# Patient Record
Sex: FEMALE | Race: BLACK OR AFRICAN AMERICAN | NOT HISPANIC OR LATINO | Employment: FULL TIME | URBAN - METROPOLITAN AREA
[De-identification: names, ages, dates, MRNs, and addresses within clinical notes are randomized per-mention and may not be internally consistent; named-entity substitution may affect disease eponyms.]

---

## 2017-08-24 ENCOUNTER — HOSPITAL ENCOUNTER (EMERGENCY)
Facility: HOSPITAL | Age: 62
Discharge: HOME | End: 2017-08-24
Attending: EMERGENCY MEDICINE | Admitting: EMERGENCY MEDICINE

## 2017-08-24 ENCOUNTER — APPOINTMENT (OUTPATIENT)
Dept: MRI IMAGING | Facility: HOSPITAL | Age: 62
End: 2017-08-24

## 2017-08-24 ENCOUNTER — APPOINTMENT (OUTPATIENT)
Dept: GENERAL RADIOLOGY | Facility: HOSPITAL | Age: 62
End: 2017-08-24

## 2017-08-24 ENCOUNTER — APPOINTMENT (OUTPATIENT)
Dept: CT IMAGING | Facility: HOSPITAL | Age: 62
End: 2017-08-24

## 2017-08-24 VITALS
WEIGHT: 195 LBS | DIASTOLIC BLOOD PRESSURE: 88 MMHG | HEIGHT: 69 IN | HEART RATE: 79 BPM | BODY MASS INDEX: 28.88 KG/M2 | RESPIRATION RATE: 20 BRPM | TEMPERATURE: 98.6 F | SYSTOLIC BLOOD PRESSURE: 162 MMHG | OXYGEN SATURATION: 100 %

## 2017-08-24 DIAGNOSIS — R42 VERTIGO: ICD-10-CM

## 2017-08-24 DIAGNOSIS — R42 DIZZINESS: Primary | ICD-10-CM

## 2017-08-24 DIAGNOSIS — I10 ESSENTIAL HYPERTENSION: ICD-10-CM

## 2017-08-24 LAB
ALBUMIN SERPL-MCNC: 4.8 G/DL (ref 3.2–4.8)
ALBUMIN/GLOB SERPL: 1.4 G/DL (ref 1.5–2.5)
ALP SERPL-CCNC: 105 U/L (ref 25–100)
ALT SERPL W P-5'-P-CCNC: 29 U/L (ref 7–40)
ANION GAP SERPL CALCULATED.3IONS-SCNC: 12 MMOL/L (ref 3–11)
AST SERPL-CCNC: 26 U/L (ref 0–33)
BACTERIA UR QL AUTO: ABNORMAL /HPF
BASOPHILS # BLD AUTO: 0.01 10*3/MM3 (ref 0–0.2)
BASOPHILS NFR BLD AUTO: 0.1 % (ref 0–1)
BILIRUB SERPL-MCNC: 1 MG/DL (ref 0.3–1.2)
BILIRUB UR QL STRIP: ABNORMAL
BUN BLD-MCNC: 13 MG/DL (ref 9–23)
BUN/CREAT SERPL: 18.6 (ref 7–25)
CALCIUM SPEC-SCNC: 9.9 MG/DL (ref 8.7–10.4)
CHLORIDE SERPL-SCNC: 102 MMOL/L (ref 99–109)
CLARITY UR: CLEAR
CO2 SERPL-SCNC: 24 MMOL/L (ref 20–31)
COLOR UR: YELLOW
CREAT BLD-MCNC: 0.7 MG/DL (ref 0.6–1.3)
DEPRECATED RDW RBC AUTO: 41.3 FL (ref 37–54)
EOSINOPHIL # BLD AUTO: 0.05 10*3/MM3 (ref 0–0.3)
EOSINOPHIL NFR BLD AUTO: 0.7 % (ref 0–3)
ERYTHROCYTE [DISTWIDTH] IN BLOOD BY AUTOMATED COUNT: 12.6 % (ref 11.3–14.5)
GFR SERPL CREATININE-BSD FRML MDRD: 102 ML/MIN/1.73
GLOBULIN UR ELPH-MCNC: 3.5 GM/DL
GLUCOSE BLD-MCNC: 105 MG/DL (ref 70–100)
GLUCOSE UR STRIP-MCNC: NEGATIVE MG/DL
HCT VFR BLD AUTO: 46.9 % (ref 34.5–44)
HGB BLD-MCNC: 15.5 G/DL (ref 11.5–15.5)
HGB UR QL STRIP.AUTO: NEGATIVE
HOLD SPECIMEN: NORMAL
HOLD SPECIMEN: NORMAL
HYALINE CASTS UR QL AUTO: ABNORMAL /LPF
IMM GRANULOCYTES # BLD: 0.02 10*3/MM3 (ref 0–0.03)
IMM GRANULOCYTES NFR BLD: 0.3 % (ref 0–0.6)
KETONES UR QL STRIP: ABNORMAL
LEUKOCYTE ESTERASE UR QL STRIP.AUTO: NEGATIVE
LYMPHOCYTES # BLD AUTO: 2.16 10*3/MM3 (ref 0.6–4.8)
LYMPHOCYTES NFR BLD AUTO: 30.5 % (ref 24–44)
MAGNESIUM SERPL-MCNC: 2.4 MG/DL (ref 1.3–2.7)
MCH RBC QN AUTO: 29.6 PG (ref 27–31)
MCHC RBC AUTO-ENTMCNC: 33 G/DL (ref 32–36)
MCV RBC AUTO: 89.7 FL (ref 80–99)
MONOCYTES # BLD AUTO: 0.56 10*3/MM3 (ref 0–1)
MONOCYTES NFR BLD AUTO: 7.9 % (ref 0–12)
NEUTROPHILS # BLD AUTO: 4.28 10*3/MM3 (ref 1.5–8.3)
NEUTROPHILS NFR BLD AUTO: 60.5 % (ref 41–71)
NITRITE UR QL STRIP: NEGATIVE
PH UR STRIP.AUTO: 5.5 [PH] (ref 5–8)
PLATELET # BLD AUTO: 257 10*3/MM3 (ref 150–450)
PMV BLD AUTO: 10.2 FL (ref 6–12)
POTASSIUM BLD-SCNC: 3.4 MMOL/L (ref 3.5–5.5)
PROT SERPL-MCNC: 8.3 G/DL (ref 5.7–8.2)
PROT UR QL STRIP: ABNORMAL
RBC # BLD AUTO: 5.23 10*6/MM3 (ref 3.89–5.14)
RBC # UR: ABNORMAL /HPF
REF LAB TEST METHOD: ABNORMAL
SODIUM BLD-SCNC: 138 MMOL/L (ref 132–146)
SP GR UR STRIP: 1.02 (ref 1–1.03)
SQUAMOUS #/AREA URNS HPF: ABNORMAL /HPF
TROPONIN I SERPL-MCNC: 0 NG/ML (ref 0–0.07)
TROPONIN I SERPL-MCNC: 0 NG/ML (ref 0–0.07)
UROBILINOGEN UR QL STRIP: ABNORMAL
WBC NRBC COR # BLD: 7.08 10*3/MM3 (ref 3.5–10.8)
WBC UR QL AUTO: ABNORMAL /HPF
WHOLE BLOOD HOLD SPECIMEN: NORMAL
WHOLE BLOOD HOLD SPECIMEN: NORMAL

## 2017-08-24 PROCEDURE — 71010 HC CHEST PA OR AP: CPT

## 2017-08-24 PROCEDURE — 85025 COMPLETE CBC W/AUTO DIFF WBC: CPT | Performed by: EMERGENCY MEDICINE

## 2017-08-24 PROCEDURE — 80053 COMPREHEN METABOLIC PANEL: CPT | Performed by: EMERGENCY MEDICINE

## 2017-08-24 PROCEDURE — 93005 ELECTROCARDIOGRAM TRACING: CPT

## 2017-08-24 PROCEDURE — 84484 ASSAY OF TROPONIN QUANT: CPT

## 2017-08-24 PROCEDURE — 96360 HYDRATION IV INFUSION INIT: CPT

## 2017-08-24 PROCEDURE — 99284 EMERGENCY DEPT VISIT MOD MDM: CPT

## 2017-08-24 PROCEDURE — 70551 MRI BRAIN STEM W/O DYE: CPT

## 2017-08-24 PROCEDURE — 81001 URINALYSIS AUTO W/SCOPE: CPT | Performed by: EMERGENCY MEDICINE

## 2017-08-24 PROCEDURE — 83735 ASSAY OF MAGNESIUM: CPT | Performed by: EMERGENCY MEDICINE

## 2017-08-24 PROCEDURE — 70450 CT HEAD/BRAIN W/O DYE: CPT

## 2017-08-24 RX ORDER — SODIUM CHLORIDE 0.9 % (FLUSH) 0.9 %
10 SYRINGE (ML) INJECTION AS NEEDED
Status: DISCONTINUED | OUTPATIENT
Start: 2017-08-24 | End: 2017-08-25 | Stop reason: HOSPADM

## 2017-08-24 RX ORDER — LABETALOL HYDROCHLORIDE 5 MG/ML
20 INJECTION, SOLUTION INTRAVENOUS ONCE
Status: DISCONTINUED | OUTPATIENT
Start: 2017-08-24 | End: 2017-08-25 | Stop reason: HOSPADM

## 2017-08-24 RX ORDER — MECLIZINE HYDROCHLORIDE 25 MG/1
25 TABLET ORAL ONCE
Status: COMPLETED | OUTPATIENT
Start: 2017-08-24 | End: 2017-08-24

## 2017-08-24 RX ORDER — LISINOPRIL AND HYDROCHLOROTHIAZIDE 12.5; 1 MG/1; MG/1
1 TABLET ORAL DAILY
Qty: 30 TABLET | Refills: 0 | Status: SHIPPED | OUTPATIENT
Start: 2017-08-24

## 2017-08-24 RX ORDER — MECLIZINE HYDROCHLORIDE 25 MG/1
25 TABLET ORAL EVERY 6 HOURS PRN
Qty: 24 TABLET | Refills: 0 | Status: SHIPPED | OUTPATIENT
Start: 2017-08-24

## 2017-08-24 RX ADMIN — SODIUM CHLORIDE 1000 ML: 9 INJECTION, SOLUTION INTRAVENOUS at 19:58

## 2017-08-24 RX ADMIN — MECLIZINE 25 MG: 25 TABLET ORAL at 19:55

## 2017-08-24 NOTE — ED PROVIDER NOTES
Subjective   HPI Comments: Nataliia Werner is a 62 y.o. female who presents to the ED with c/o dizziness with onset yesterday at 0545. Patient states that as she woke up she suddenly felt as if the room was spinning. Her dizziness can be resolved when she stares in one direction for an extended period of time but it is exacerbated when she looks in different directions. She also complains of photophobia, generalized weakness, ear pressure, lightheadedness, and that her face is warm to touch.  Her dizziness was resolved yesterday at 1400 and her sx have been mostly resolved at this point. She denies any N/V, blurred vision, headache, chest pain, SoA, rhinorrhea, cough, congestion chills, urinary sx, or any other complaints at this time.      Patient is a 62 y.o. female presenting with dizziness.   History provided by:  Patient  Dizziness   Quality:  Room spinning and lightheadedness  Severity:  Mild  Onset quality:  Sudden  Duration: onset yesterday at 0545.  Timing:  Constant  Progression:  Partially resolved  Chronicity:  New  Relieved by:  Being still  Worsened by:  Turning head  Ineffective treatments:  None tried  Associated symptoms: weakness    Associated symptoms: no chest pain, no headaches, no nausea, no shortness of breath, no vision changes and no vomiting        Review of Systems   Constitutional: Negative for chills and fever.   HENT: Negative for congestion and rhinorrhea.    Eyes: Positive for photophobia. Negative for visual disturbance (No blurred vision).   Respiratory: Negative for cough and shortness of breath.    Cardiovascular: Negative for chest pain.   Gastrointestinal: Negative for nausea and vomiting.   Genitourinary: Negative for difficulty urinating, dysuria and frequency.   Neurological: Positive for dizziness, weakness and light-headedness. Negative for headaches.   All other systems reviewed and are negative.      History reviewed. No pertinent past medical history.    No Known  Allergies    Past Surgical History:   Procedure Laterality Date   • HYSTERECTOMY     • TONSILLECTOMY         History reviewed. No pertinent family history.    Social History     Social History   • Marital status:      Spouse name: N/A   • Number of children: N/A   • Years of education: N/A     Social History Main Topics   • Smoking status: Never Smoker   • Smokeless tobacco: None   • Alcohol use No   • Drug use: No   • Sexual activity: Defer     Other Topics Concern   • None     Social History Narrative   • None         Objective   Physical Exam   Constitutional: She is oriented to person, place, and time. She appears well-developed and well-nourished. No distress.   HENT:   Head: Normocephalic and atraumatic.   Eyes: EOM are normal. Pupils are equal, round, and reactive to light.   Neck: Normal range of motion. Neck supple.   Cardiovascular: Normal rate, regular rhythm and normal heart sounds.    No murmur heard.  Pulmonary/Chest: Effort normal and breath sounds normal. No respiratory distress.   Abdominal: Soft. There is no tenderness.   Musculoskeletal: Normal range of motion. She exhibits no edema or deformity.   Neurological: She is alert and oriented to person, place, and time. No cranial nerve deficit.   Skin: Skin is warm and dry.   Psychiatric: She has a normal mood and affect. Her behavior is normal.   Nursing note and vitals reviewed.      Procedures         ED Course  ED Course   Comment By Time   2200  per discussed with patient this time.  Patient reports that she feels much better at this time.  Patient denies any dizziness, vertiginous symptoms.  Will be discharged home with a prescription for meclizine.  Patient encouraged to follow-up with PCP, ENT.  Patient agrees and verbalizes understanding. Oly Moore, APRN 08/25 0229     Recent Results (from the past 24 hour(s))   Comprehensive Metabolic Panel    Collection Time: 08/24/17  3:59 PM   Result Value Ref Range    Glucose 105 (H) 70 -  100 mg/dL    BUN 13 9 - 23 mg/dL    Creatinine 0.70 0.60 - 1.30 mg/dL    Sodium 138 132 - 146 mmol/L    Potassium 3.4 (L) 3.5 - 5.5 mmol/L    Chloride 102 99 - 109 mmol/L    CO2 24.0 20.0 - 31.0 mmol/L    Calcium 9.9 8.7 - 10.4 mg/dL    Total Protein 8.3 (H) 5.7 - 8.2 g/dL    Albumin 4.80 3.20 - 4.80 g/dL    ALT (SGPT) 29 7 - 40 U/L    AST (SGOT) 26 0 - 33 U/L    Alkaline Phosphatase 105 (H) 25 - 100 U/L    Total Bilirubin 1.0 0.3 - 1.2 mg/dL    eGFR  African Amer 102 >60 mL/min/1.73    Globulin 3.5 gm/dL    A/G Ratio 1.4 (L) 1.5 - 2.5 g/dL    BUN/Creatinine Ratio 18.6 7.0 - 25.0    Anion Gap 12.0 (H) 3.0 - 11.0 mmol/L   Magnesium    Collection Time: 08/24/17  3:59 PM   Result Value Ref Range    Magnesium 2.4 1.3 - 2.7 mg/dL   Light Blue Top    Collection Time: 08/24/17  3:59 PM   Result Value Ref Range    Extra Tube hold for add-on    Green Top (Gel)    Collection Time: 08/24/17  3:59 PM   Result Value Ref Range    Extra Tube Hold for add-ons.    Lavender Top    Collection Time: 08/24/17  3:59 PM   Result Value Ref Range    Extra Tube hold for add-on    Gold Top - SST    Collection Time: 08/24/17  3:59 PM   Result Value Ref Range    Extra Tube Hold for add-ons.    CBC Auto Differential    Collection Time: 08/24/17  3:59 PM   Result Value Ref Range    WBC 7.08 3.50 - 10.80 10*3/mm3    RBC 5.23 (H) 3.89 - 5.14 10*6/mm3    Hemoglobin 15.5 11.5 - 15.5 g/dL    Hematocrit 46.9 (H) 34.5 - 44.0 %    MCV 89.7 80.0 - 99.0 fL    MCH 29.6 27.0 - 31.0 pg    MCHC 33.0 32.0 - 36.0 g/dL    RDW 12.6 11.3 - 14.5 %    RDW-SD 41.3 37.0 - 54.0 fl    MPV 10.2 6.0 - 12.0 fL    Platelets 257 150 - 450 10*3/mm3    Neutrophil % 60.5 41.0 - 71.0 %    Lymphocyte % 30.5 24.0 - 44.0 %    Monocyte % 7.9 0.0 - 12.0 %    Eosinophil % 0.7 0.0 - 3.0 %    Basophil % 0.1 0.0 - 1.0 %    Immature Grans % 0.3 0.0 - 0.6 %    Neutrophils, Absolute 4.28 1.50 - 8.30 10*3/mm3    Lymphocytes, Absolute 2.16 0.60 - 4.80 10*3/mm3    Monocytes, Absolute 0.56 0.00  - 1.00 10*3/mm3    Eosinophils, Absolute 0.05 0.00 - 0.30 10*3/mm3    Basophils, Absolute 0.01 0.00 - 0.20 10*3/mm3    Immature Grans, Absolute 0.02 0.00 - 0.03 10*3/mm3   POC Troponin, Rapid    Collection Time: 08/24/17  4:00 PM   Result Value Ref Range    Troponin I 0.00 0.00 - 0.07 ng/mL   POC Troponin, Rapid    Collection Time: 08/24/17  6:28 PM   Result Value Ref Range    Troponin I 0.00 0.00 - 0.07 ng/mL   Urinalysis With / Culture If Indicated    Collection Time: 08/24/17  6:57 PM   Result Value Ref Range    Color, UA Yellow Yellow, Straw    Appearance, UA Clear Clear    pH, UA 5.5 5.0 - 8.0    Specific Gravity, UA 1.018 1.001 - 1.030    Glucose, UA Negative Negative    Ketones, UA 40 mg/dL (2+) (A) Negative    Bilirubin, UA Small (1+) (A) Negative    Blood, UA Negative Negative    Protein, UA 30 mg/dL (1+) (A) Negative    Leuk Esterase, UA Negative Negative    Nitrite, UA Negative Negative    Urobilinogen, UA 0.2 E.U./dL 0.2 - 1.0 E.U./dL   Urinalysis, Microscopic Only    Collection Time: 08/24/17  6:57 PM   Result Value Ref Range    RBC, UA 0-2 None Seen, 0-2 /HPF    WBC, UA 0-2 (A) None Seen /HPF    Bacteria, UA None Seen None Seen, Trace /HPF    Squamous Epithelial Cells, UA 0-2 None Seen, 0-2 /HPF    Hyaline Casts, UA 0-6 0 - 6 /LPF    Methodology Automated Microscopy      Note: In addition to lab results from this visit, the labs listed above may include labs taken at another facility or during a different encounter within the last 24 hours. Please correlate lab times with ED admission and discharge times for further clarification of the services performed during this visit.    MRI Brain Without Contrast   Final Result   Abnormal     Normal head/brain MRI.      THIS DOCUMENT HAS BEEN ELECTRONICALLY SIGNED BY CHRISTOPHER BRAY MD      CT Head Without Contrast   Final Result   Abnormal     Normal head/brain CT.      THIS DOCUMENT HAS BEEN ELECTRONICALLY SIGNED BY CHRISTOPHER BRAY MD      XR Chest 1 View   Final  Result   Abnormal     Normal chest x-ray.      THIS DOCUMENT HAS BEEN ELECTRONICALLY SIGNED BY CHRISTOPHER BRAY MD        Vitals:    08/24/17 2000 08/24/17 2030 08/24/17 2043 08/24/17 2102   BP: (!) 194/102 173/89  162/88   BP Location:       Patient Position: Standing      Pulse: 95  97 79   Resp:       Temp:       TempSrc:       SpO2:  100%     Weight:       Height:         Medications   sodium chloride 0.9 % bolus 1,000 mL (0 mL Intravenous Stopped 8/24/17 2045)   meclizine (ANTIVERT) tablet 25 mg (25 mg Oral Given 8/24/17 1955)     ECG/EMG Results (last 24 hours)     Procedure Component Value Units Date/Time    ECG 12 Lead [[id number]] Collected:  08/24/17 1555     Updated:  08/24/17 1555    ECG 12 Lead [[id number]] Collected:  08/24/17 1823     Updated:  08/24/17 1823                        MDM    Final diagnoses:   Dizziness   Vertigo   Essential hypertension       Documentation assistance provided by ariel Hoskins.  Information recorded by the ariel was done at my direction and has been verified and validated by me.     Tanmay Hoskins  08/24/17 1922       Tanmay Hoskins  08/24/17 2025       Tanmay Hoskins  08/24/17 2239       Tanmay Hoskins  08/24/17 2241       LOPEZ Mckeon  08/25/17 0224